# Patient Record
Sex: MALE | Race: OTHER | NOT HISPANIC OR LATINO | URBAN - METROPOLITAN AREA
[De-identification: names, ages, dates, MRNs, and addresses within clinical notes are randomized per-mention and may not be internally consistent; named-entity substitution may affect disease eponyms.]

---

## 2018-08-07 ENCOUNTER — EMERGENCY (EMERGENCY)
Facility: HOSPITAL | Age: 38
LOS: 0 days | Discharge: HOME | End: 2018-08-07
Admitting: PHYSICIAN ASSISTANT

## 2018-08-07 VITALS
DIASTOLIC BLOOD PRESSURE: 56 MMHG | HEART RATE: 75 BPM | TEMPERATURE: 98 F | SYSTOLIC BLOOD PRESSURE: 117 MMHG | RESPIRATION RATE: 18 BRPM | OXYGEN SATURATION: 97 %

## 2018-08-07 VITALS — WEIGHT: 146.17 LBS

## 2018-08-07 DIAGNOSIS — K08.89 OTHER SPECIFIED DISORDERS OF TEETH AND SUPPORTING STRUCTURES: ICD-10-CM

## 2018-08-07 DIAGNOSIS — K02.9 DENTAL CARIES, UNSPECIFIED: ICD-10-CM

## 2018-08-07 DIAGNOSIS — F17.200 NICOTINE DEPENDENCE, UNSPECIFIED, UNCOMPLICATED: ICD-10-CM

## 2018-08-07 NOTE — PROGRESS NOTE ADULT - SUBJECTIVE AND OBJECTIVE BOX
Patient is a 38y old  Male who presents with a chief complaint of "My lower left tooth has been bothering me since last night, I can't sleep."     #18 MO caries into pulpal tissue/  dx: symptomatic irreversible pulpitis.     HPI: Patient reported restoration on lower molar (identified as tooth #18) broke about 1 year ago and was placed about 10 years ago. Severe pain began yesterday 8/6/2018.      PAST MEDICAL & SURGICAL HISTORY:  No pertinent past medical history    ( -  ) heart valve replacement  ( -  ) joint replacement      MEDICATIONS  (STANDING): Denies     MEDICATIONS  (PRN): 500mg amoxicillin and 600mg ibuprofen PRN      REVIEW OF SYSTEMS      General: WNL	    Skin/Breast: WNL  	  Ophthalmologic: WNL  	  ENMT:	#18 MO caries, fractured restoration    Respiratory and Thorax:WNL  	  Cardiovascular:	WNL    Gastrointestinal:	 WNL    Genitourinary:	WNL    Musculoskeletal:	 WN L    Neurological:	WNL    Psychiatric:	WNL    Hematology/Lymphatics:	 WNL    Endocrine:	WNL    Allergic/Immunologic: Patient denies. No known drug allergies. 	    Allergies      Intolerances        FAMILY HISTORY:      *SOCIAL HISTORY: (  + ) Tobacco; (  - ) ETOH    Vital Signs Last 24 Hrs  T(C): 36.9 (07 Aug 2018 14:43), Max: 36.9 (07 Aug 2018 14:43)  T(F): 98.5 (07 Aug 2018 14:43), Max: 98.5 (07 Aug 2018 14:43)  HR: 75 (07 Aug 2018 14:43) (75 - 75)  BP: 117/56 (07 Aug 2018 14:43) (117/56 - 117/56)  BP(mean): --  RR: 18 (07 Aug 2018 14:43) (18 - 18)  SpO2: 97% (07 Aug 2018 14:43) (97% - 97%)    LABS:                  Last Dental Visit: <<  >>    EOE:  TMJ ( -  ) clicks                     (  - ) pops                     (  - ) crepitus             Mandible <<FROM>>             Facial bones and MOM <<grossly intact>>             ( -  ) trismus             ( -  ) lymphadenopathy             ( - ) swelling             ( -  ) asymmetry             ( -  ) palpation             (  - ) dyspnea             ( - ) dysphagia             (-   ) loss of consciousness    IOE:  <<permanent dentition            <<multiple carious teeth>>                      Caries << #18 MO  >>                hard/soft palate:  (   ) palatal torus, <<No pathology noted>>            tongue/FOM <<No pathology noted>>            labial/buccal mucosa <<No pathology noted>>           ( +  ) percussion           ( +  ) palpation           ( -  ) swelling            (  - ) abscess           ( -  ) sinus tract    *DENTAL RADIOGRAPHS: 1 Pre-operative periapical. Radiographic impression: caries into pulp.     RADIOLOGY & ADDITIONAL STUDIES:    *ASSESSMENT: #18 caries into pulp. Patient wants tooth extracted, explained other treatment options including root canal treatment. Patient preferred extraction of #18.     *PLAN: #18 extraction     PROCEDURE:   Verbal and written consent given.  Anesthesia: 1 Carpule of 2% lidocaine administered via inferior alveolar nerve block and 1 Carpule of 4% Septocaine administered via inferior alveolar nerve block. 3 Carpule of 4% Septocaine administered via PDL and buccal infiltrations.   Treatment: #18 elevated and surgically extracted with out complications. Post-operative radiograph taken. Post-operative instructions given oral and written. Prescribed 500mg Amoxicillin for 7 days and 600mg ibuprofen for 3 days.     RECOMMENDATIONS:  1) Soft diet, no smoking for at least 48 hours, and take medications as prescribed.   2) Dental F/U with outpatient dentist for comprehensive dental care.   3) If any difficulty swallowing/breathing, fever occur, return to ER.     Dariela Nair Patient is a 38y old  Male who presents with a chief complaint of "My lower left tooth has been bothering me since last night, I can't sleep."     #18 MO caries into pulpal tissue/  dx: symptomatic irreversible pulpitis.     HPI: Patient reported restoration on lower molar (identified as tooth #18) broke about 1 year ago and was placed about 10 years ago. Severe pain began yesterday 8/6/2018.      PAST MEDICAL & SURGICAL HISTORY:  No pertinent past medical history    ( -  ) heart valve replacement  ( -  ) joint replacement      MEDICATIONS  (STANDING): Denies     MEDICATIONS  (PRN): 500mg amoxicillin and 600mg ibuprofen PRN      REVIEW OF SYSTEMS      General: WNL	    Skin/Breast: WNL  	  Ophthalmologic: WNL  	  ENMT:	#18 MO caries, fractured restoration    Respiratory and Thorax:WNL  	  Cardiovascular:	WNL    Gastrointestinal:	 WNL    Genitourinary:	WNL    Musculoskeletal:	 WN L    Neurological:	WNL    Psychiatric:	WNL    Hematology/Lymphatics:	 WNL    Endocrine:	WNL    Allergic/Immunologic: Patient denies. No known drug allergies. 	    Allergies      Intolerances        FAMILY HISTORY:      *SOCIAL HISTORY: (  + ) Tobacco; (  - ) ETOH    Vital Signs Last 24 Hrs  T(C): 36.9 (07 Aug 2018 14:43), Max: 36.9 (07 Aug 2018 14:43)  T(F): 98.5 (07 Aug 2018 14:43), Max: 98.5 (07 Aug 2018 14:43)  HR: 75 (07 Aug 2018 14:43) (75 - 75)  BP: 117/56 (07 Aug 2018 14:43) (117/56 - 117/56)  BP(mean): --  RR: 18 (07 Aug 2018 14:43) (18 - 18)  SpO2: 97% (07 Aug 2018 14:43) (97% - 97%)    LABS:                  Last Dental Visit: <<  >>    EOE:  TMJ ( -  ) clicks                     (  - ) pops                     (  - ) crepitus             Mandible <<FROM>>             Facial bones and MOM <<grossly intact>>             ( -  ) trismus             ( -  ) lymphadenopathy             ( - ) swelling             ( -  ) asymmetry             ( -  ) palpation             (  - ) dyspnea             ( - ) dysphagia             (-   ) loss of consciousness    IOE:  <<permanent dentition            <<multiple carious teeth>>                      Caries << #18 MO  >>                hard/soft palate:  (   ) palatal torus, <<No pathology noted>>            tongue/FOM <<No pathology noted>>            labial/buccal mucosa <<No pathology noted>>           ( +  ) percussion           ( +  ) palpation           ( -  ) swelling            (  - ) abscess           ( -  ) sinus tract    *DENTAL RADIOGRAPHS: 1 Pre-operative periapical. Radiographic impression: caries into pulp.     RADIOLOGY & ADDITIONAL STUDIES:    *ASSESSMENT: #18 caries into pulp. Patient wants tooth extracted, explained other treatment options including root canal treatment. Patient preferred extraction of #18.     *PLAN: #18 extraction     PROCEDURE:   Verbal and written consent given. Risks and benefits explained to patient as per Oral Surgery sheet dated 7/13/00.  Anesthesia: 1 Carpule of 2% lidocaine administered via inferior alveolar nerve block and 1 Carpule of 4% Septocaine administered via inferior alveolar nerve block. 3 Carpule of 4% Septocaine administered via PDL and buccal infiltrations.   Treatment: #18 elevated and surgically extracted with out complications. Post-operative radiograph taken. Post-operative instructions given oral and written. Prescribed 500mg Amoxicillin for 7 days and 600mg ibuprofen for 3 days.     RECOMMENDATIONS:  1) Soft diet, no smoking for at least 48 hours, and take medications as prescribed.   2) Dental F/U with outpatient dentist for comprehensive dental care.   3) If any difficulty swallowing/breathing, fever occur, return to ER.     Dariela Nair

## 2018-08-07 NOTE — ED PROVIDER NOTE - MEDICAL DECISION MAKING DETAILS
Patient evaluated for dental pain; no signs of infection/ abscess. Will transfer to dental for further evaluation.

## 2018-08-07 NOTE — ED ADULT NURSE NOTE - OBJECTIVE STATEMENT
patient presents to ER with dental pain since last night after filling fell out, patient denies fever or other symptoms. alert and in no distress. transferred to dental per PA order for further evaluation.

## 2022-09-27 NOTE — ED ADULT NURSE NOTE - PMH
No pertinent past medical history High Dose Vitamin A Pregnancy And Lactation Text: High dose vitamin A therapy is contraindicated during pregnancy and breast feeding.

## 2024-04-16 NOTE — ED PROVIDER NOTE - OBJECTIVE STATEMENT
39 y/o male presents to the ED c/o "I have a extreme toothache. My filling came out awhile back and when I was eating last night the pain became severe. " no fever/ chills/ facial swelling/ difficulty swallowing
Never smoker